# Patient Record
Sex: MALE | Race: WHITE
[De-identification: names, ages, dates, MRNs, and addresses within clinical notes are randomized per-mention and may not be internally consistent; named-entity substitution may affect disease eponyms.]

---

## 2019-10-09 ENCOUNTER — HOSPITAL ENCOUNTER (OUTPATIENT)
Dept: HOSPITAL 47 - OR | Age: 74
Discharge: HOME | End: 2019-10-09
Attending: OPHTHALMOLOGY
Payer: MEDICARE

## 2019-10-09 VITALS — DIASTOLIC BLOOD PRESSURE: 71 MMHG | RESPIRATION RATE: 15 BRPM | HEART RATE: 53 BPM | SYSTOLIC BLOOD PRESSURE: 113 MMHG

## 2019-10-09 VITALS — TEMPERATURE: 97 F

## 2019-10-09 DIAGNOSIS — Z85.048: ICD-10-CM

## 2019-10-09 DIAGNOSIS — H52.13: ICD-10-CM

## 2019-10-09 DIAGNOSIS — Z82.49: ICD-10-CM

## 2019-10-09 DIAGNOSIS — H52.4: ICD-10-CM

## 2019-10-09 DIAGNOSIS — Z79.899: ICD-10-CM

## 2019-10-09 DIAGNOSIS — H25.13: Primary | ICD-10-CM

## 2019-10-09 DIAGNOSIS — Z87.891: ICD-10-CM

## 2019-10-09 DIAGNOSIS — Z90.89: ICD-10-CM

## 2019-10-09 DIAGNOSIS — Z98.890: ICD-10-CM

## 2019-10-09 DIAGNOSIS — E78.5: ICD-10-CM

## 2019-10-09 DIAGNOSIS — H91.90: ICD-10-CM

## 2019-10-09 DIAGNOSIS — E78.00: ICD-10-CM

## 2019-10-09 DIAGNOSIS — Z88.6: ICD-10-CM

## 2019-10-09 DIAGNOSIS — H25.013: ICD-10-CM

## 2019-10-09 DIAGNOSIS — Z97.3: ICD-10-CM

## 2019-10-09 DIAGNOSIS — Z80.3: ICD-10-CM

## 2019-10-09 PROCEDURE — 66984 XCAPSL CTRC RMVL W/O ECP: CPT

## 2019-10-09 RX ADMIN — PHENYLEPHRINE HYDROCHLORIDE NR DROPS: 25 SOLUTION/ DROPS OPHTHALMIC at 09:36

## 2019-10-09 RX ADMIN — PHENYLEPHRINE HYDROCHLORIDE NR DROPS: 25 SOLUTION/ DROPS OPHTHALMIC at 09:24

## 2019-10-09 RX ADMIN — CYCLOPENTOLATE HYDROCHLORIDE ONE DROPS: 10 SOLUTION/ DROPS OPHTHALMIC at 09:19

## 2019-10-09 RX ADMIN — CYCLOPENTOLATE HYDROCHLORIDE ONE DROPS: 10 SOLUTION/ DROPS OPHTHALMIC at 09:28

## 2019-10-09 RX ADMIN — CYCLOPENTOLATE HYDROCHLORIDE ONE DROPS: 10 SOLUTION/ DROPS OPHTHALMIC at 09:33

## 2019-10-09 RX ADMIN — PHENYLEPHRINE HYDROCHLORIDE NR DROPS: 25 SOLUTION/ DROPS OPHTHALMIC at 09:31

## 2019-10-09 NOTE — P.OP
Date of Procedure: 10/09/19


Preoperative Diagnosis: 


NS & CS


Postoperative Diagnosis: 


same


Procedure(s) Performed: 


PIOL, OD


Implants: 


PCB00 23.50


Anesthesia: MAC


Surgeon: Pawel Kauffman


Estimated Blood Loss (ml): 0


Pathology: none sent


Condition: stable


Disposition: same day


Indications for Procedure: 


blurry vision


Operative Findings: 


no complications

## 2019-10-09 NOTE — OP
OPERATIVE REPORT



DATE OF SURGERY:

October 9, 2019.



PROCEDURE PERFORMED:

Phacoemulsification of cataract and intraocular lens implant of the right eye.



ASSISTANT:



PREOPERATIVE DIAGNOSES:

Nuclear sclerosis. Cortical sclerosis.



POSTOPERATIVE DIAGNOSES:

Nuclear sclerosis. Cortical sclerosis.



OPERATION:

Clear cornea phacoemulsification of cataract right, OD eye.



ESTIMATED BLOOD LOSS:

Zero.



SPECIMEN TAKEN:

None.



NARRATIVE:

After obtaining the appropriate consent, the patient was brought to the Operating Room

where the patient was placed under cardiac monitoring and prepped and draped in the

usual sterile manner.  At the 11 o'clock position a 15 degree super sharp blade was

used to create a paracentesis followed by instillation of 1% Xylocaine MPF 50:50 mix

with BSS into the anterior chamber.  This was followed by Amvisc to stabilize the

anterior chamber.  At the 9 o'clock position a self-sealing corneal flap incision was

created using 2.8 mm varinder keratome.  A cystatome was used to initiate a continuous

tear capsulorrhexis which was completed with the Utrata forceps.  A Binkhorst cannula

was used to hydrodissect the lens nucleus followed by hydrodelineation.

Phacoemulsification of the lens was performed utilizing phacochop in 19.62 seconds at

12% power.  The remaining cortical material was removed using the irrigation aspiration

mode followed by additional 1% Xylocaine MPF into the anterior chamber followed by

viscoelastic to stabilize the capsular bag.  An JIM PZB 00 23.5 diopter posterior

chamber lens was placed into the capsular bag without difficulty.  The remaining

viscoelastic material was removed from the anterior chamber with the

irrigation/aspiration.  Balanced salt solution was used to normalize the intraocular

pressure.  The incision was checked for watertight integrity.  The patient then

received two drops of 0.5% timolol followed by two drops Vigamox, was lightly patched

and shielded in the usual manner.  There were no complications from the procedure.  The

patient tolerated the procedure well and was returned to recovery in good condition.





MMODL / IJN: [id number] / Job#: [id number]

## 2019-10-23 ENCOUNTER — HOSPITAL ENCOUNTER (OUTPATIENT)
Dept: HOSPITAL 47 - OR | Age: 74
Discharge: HOME | End: 2019-10-23
Attending: OPHTHALMOLOGY
Payer: MEDICARE

## 2019-10-23 VITALS — HEART RATE: 57 BPM | DIASTOLIC BLOOD PRESSURE: 71 MMHG | SYSTOLIC BLOOD PRESSURE: 111 MMHG

## 2019-10-23 VITALS — TEMPERATURE: 97.7 F | RESPIRATION RATE: 16 BRPM

## 2019-10-23 VITALS — BODY MASS INDEX: 35.2 KG/M2

## 2019-10-23 DIAGNOSIS — E78.00: ICD-10-CM

## 2019-10-23 DIAGNOSIS — H52.4: ICD-10-CM

## 2019-10-23 DIAGNOSIS — Z98.41: ICD-10-CM

## 2019-10-23 DIAGNOSIS — Z85.048: ICD-10-CM

## 2019-10-23 DIAGNOSIS — Z90.89: ICD-10-CM

## 2019-10-23 DIAGNOSIS — H52.13: ICD-10-CM

## 2019-10-23 DIAGNOSIS — Z79.899: ICD-10-CM

## 2019-10-23 DIAGNOSIS — Z88.6: ICD-10-CM

## 2019-10-23 DIAGNOSIS — Z97.3: ICD-10-CM

## 2019-10-23 DIAGNOSIS — H25.812: Primary | ICD-10-CM

## 2019-10-23 DIAGNOSIS — Z82.49: ICD-10-CM

## 2019-10-23 DIAGNOSIS — E78.5: ICD-10-CM

## 2019-10-23 DIAGNOSIS — Z80.3: ICD-10-CM

## 2019-10-23 DIAGNOSIS — Z96.1: ICD-10-CM

## 2019-10-23 PROCEDURE — 66984 XCAPSL CTRC RMVL W/O ECP: CPT

## 2019-10-23 RX ADMIN — CYCLOPENTOLATE HYDROCHLORIDE ONE DROPS: 10 SOLUTION/ DROPS OPHTHALMIC at 10:09

## 2019-10-23 RX ADMIN — PHENYLEPHRINE HYDROCHLORIDE NR DROPS: 25 SOLUTION/ DROPS OPHTHALMIC at 10:06

## 2019-10-23 RX ADMIN — PHENYLEPHRINE HYDROCHLORIDE NR DROPS: 25 SOLUTION/ DROPS OPHTHALMIC at 10:00

## 2019-10-23 RX ADMIN — CYCLOPENTOLATE HYDROCHLORIDE ONE DROPS: 10 SOLUTION/ DROPS OPHTHALMIC at 09:57

## 2019-10-23 RX ADMIN — CYCLOPENTOLATE HYDROCHLORIDE ONE DROPS: 10 SOLUTION/ DROPS OPHTHALMIC at 10:03

## 2019-10-23 RX ADMIN — PHENYLEPHRINE HYDROCHLORIDE NR DROPS: 25 SOLUTION/ DROPS OPHTHALMIC at 09:54

## 2019-10-23 NOTE — OP
OPERATIVE REPORT



DATE OF SURGERY:

10/23/2019.



PROCEDURE:

Phacoemulsification of cataract and intraocular lens implant of the left eye.



PREOPERATIVE DIAGNOSIS:

Nuclear sclerosis and cortical sclerosis, left eye.



POSTOPERATIVE DIAGNOSIS:

Nuclear sclerosis and cortical sclerosis, left eye.



ESTIMATED BLOOD LOSS:

Zero.



SPECIMEN TAKEN:

None.



NARRATIVE:

After obtaining the appropriate consent, the patient was brought to the operating room,

where the patient was placed under cardiac monitoring and prepped and draped in the

usual sterile manner.  At the 5 o'clock position a 15-degree super sharp blade was used

to create a paracentesis followed by instillation of 1% Xylocaine MPF 50:50 mix with

BSS into the anterior chamber.  This was followed by Amvisc to stabilize the anterior

chamber.  At the 3 o'clock position a self-sealing corneal flap incision was created

using 2.8 mm varinder keratome.  A cystotome was used to initiate a continuous tear

capsulorrhexis which was completed with the Utrata forceps.  A Binkhorst cannula was

used to hydrodissect the lens nucleus followed by hydrodelineation.

Phacoemulsification of the lens was performed utilizing phaco chop in 14.27 seconds at

14% power.  The remaining cortical material was removed using the irrigation aspiration

mode followed by additional 1% Xylocaine MPF into the anterior chamber followed by

viscoelastic to stabilize the capsular bag.  An JIM PCB00 24.0-diopter posterior

chamber lens was placed into the capsular bag without difficulty.  The remaining

viscoelastic material was removed from the anterior chamber with the

irrigation/aspiration.  Balanced salt solution was used to normalize the intraocular

pressure.  The incision was checked for watertight integrity.  The patient then

received two drops of 0.5% timolol followed by two drops Vigamox, was lightly patched

and shielded in the usual manner.  There were no complications from the procedure.  The

patient tolerated the procedure well and was returned to recovery in good condition.





MMODL / IJN: [id number] / Job#: [id number]

## 2019-10-23 NOTE — P.OP
Date of Procedure: 10/23/19


Preoperative Diagnosis: 


NS & CS


Postoperative Diagnosis: 


same


Procedure(s) Performed: 


PIOL, OS


Implants: 


PCB00 24.00


Anesthesia: MAC


Surgeon: Pawel Kauffman


Estimated Blood Loss (ml): 0


Pathology: none sent


Condition: stable


Disposition: same day


Indications for Procedure: 


Blurry vision


Operative Findings: 


no complications, a lot of movement during case.